# Patient Record
Sex: FEMALE | Race: BLACK OR AFRICAN AMERICAN | NOT HISPANIC OR LATINO | Employment: FULL TIME | URBAN - METROPOLITAN AREA
[De-identification: names, ages, dates, MRNs, and addresses within clinical notes are randomized per-mention and may not be internally consistent; named-entity substitution may affect disease eponyms.]

---
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
## 2022-01-27 ENCOUNTER — HOSPITAL ENCOUNTER (EMERGENCY)
Facility: HOSPITAL | Age: 22
Discharge: HOME | End: 2022-01-27
Attending: EMERGENCY MEDICINE | Admitting: EMERGENCY MEDICINE
Payer: COMMERCIAL

## 2022-01-27 VITALS
WEIGHT: 151.01 LBS | DIASTOLIC BLOOD PRESSURE: 57 MMHG | OXYGEN SATURATION: 99 % | TEMPERATURE: 98.4 F | HEART RATE: 74 BPM | SYSTOLIC BLOOD PRESSURE: 121 MMHG | RESPIRATION RATE: 18 BRPM | HEIGHT: 62 IN | BODY MASS INDEX: 27.79 KG/M2

## 2022-01-27 DIAGNOSIS — R10.9 ABDOMINAL PAIN IN PREGNANCY, SECOND TRIMESTER: Primary | ICD-10-CM

## 2022-01-27 DIAGNOSIS — O26.892 ABDOMINAL PAIN IN PREGNANCY, SECOND TRIMESTER: Primary | ICD-10-CM

## 2022-01-27 DIAGNOSIS — Z34.90 PREGNANCY, UNSPECIFIED GESTATIONAL AGE: Primary | ICD-10-CM

## 2022-01-27 LAB
ABO GROUP BLD: NORMAL
BASOPHILS # BLD AUTO: 0.03 THOUSANDS/ΜL (ref 0–0.1)
BASOPHILS NFR BLD AUTO: 0 % (ref 0–1)
BILIRUB UR QL STRIP: NEGATIVE
BILIRUB UR QL STRIP: NEGATIVE
BLD GP AB SCN SERPL QL: NEGATIVE
CLARITY UR: CLEAR
CLARITY UR: CLEAR
COLOR UR: YELLOW
COLOR UR: YELLOW
COLOR, POC: NORMAL
EOSINOPHIL # BLD AUTO: 0.05 THOUSAND/ΜL (ref 0–0.61)
EOSINOPHIL NFR BLD AUTO: 1 % (ref 0–6)
ERYTHROCYTE [DISTWIDTH] IN BLOOD BY AUTOMATED COUNT: 13.1 % (ref 11.6–15.1)
EXT PREG TEST URINE: POSITIVE
EXT. CONTROL ED NAV: ABNORMAL
GLUCOSE UR STRIP-MCNC: NEGATIVE MG/DL
GLUCOSE UR STRIP-MCNC: NEGATIVE MG/DL
HBV SURFACE AG SER QL: NORMAL
HCT VFR BLD AUTO: 30.7 % (ref 34.8–46.1)
HGB BLD-MCNC: 11 G/DL (ref 11.5–15.4)
HGB UR QL STRIP.AUTO: NEGATIVE
HGB UR QL STRIP.AUTO: NEGATIVE
IMM GRANULOCYTES # BLD AUTO: 0.03 THOUSAND/UL (ref 0–0.2)
IMM GRANULOCYTES NFR BLD AUTO: 0 % (ref 0–2)
KETONES UR STRIP-MCNC: NEGATIVE MG/DL
KETONES UR STRIP-MCNC: NEGATIVE MG/DL
LEUKOCYTE ESTERASE UR QL STRIP: NEGATIVE
LEUKOCYTE ESTERASE UR QL STRIP: NEGATIVE
LYMPHOCYTES # BLD AUTO: 2.45 THOUSANDS/ΜL (ref 0.6–4.47)
LYMPHOCYTES NFR BLD AUTO: 30 % (ref 14–44)
MCH RBC QN AUTO: 28.9 PG (ref 26.8–34.3)
MCHC RBC AUTO-ENTMCNC: 35.8 G/DL (ref 31.4–37.4)
MCV RBC AUTO: 81 FL (ref 82–98)
MONOCYTES # BLD AUTO: 0.77 THOUSAND/ΜL (ref 0.17–1.22)
MONOCYTES NFR BLD AUTO: 9 % (ref 4–12)
NEUTROPHILS # BLD AUTO: 4.95 THOUSANDS/ΜL (ref 1.85–7.62)
NEUTS SEG NFR BLD AUTO: 60 % (ref 43–75)
NITRITE UR QL STRIP: NEGATIVE
NITRITE UR QL STRIP: NEGATIVE
NRBC BLD AUTO-RTO: 0 /100 WBCS
PH UR STRIP.AUTO: 6.5 [PH]
PH UR STRIP.AUTO: 6.5 [PH] (ref 4.5–8)
PLATELET # BLD AUTO: 203 THOUSANDS/UL (ref 149–390)
PMV BLD AUTO: 10.9 FL (ref 8.9–12.7)
PROT UR STRIP-MCNC: NEGATIVE MG/DL
PROT UR STRIP-MCNC: NEGATIVE MG/DL
RBC # BLD AUTO: 3.81 MILLION/UL (ref 3.81–5.12)
RH BLD: POSITIVE
RUBV IGG SERPL IA-ACNC: 106.3 IU/ML
SP GR UR STRIP.AUTO: 1.01 (ref 1–1.03)
SP GR UR STRIP.AUTO: 1.01 (ref 1–1.03)
SPECIMEN EXPIRATION DATE: NORMAL
UROBILINOGEN UR QL STRIP.AUTO: 0.2 E.U./DL
UROBILINOGEN UR QL STRIP.AUTO: 0.2 E.U./DL
WBC # BLD AUTO: 8.28 THOUSAND/UL (ref 4.31–10.16)

## 2022-01-27 PROCEDURE — 99284 EMERGENCY DEPT VISIT MOD MDM: CPT

## 2022-01-27 PROCEDURE — 36415 COLL VENOUS BLD VENIPUNCTURE: CPT | Performed by: EMERGENCY MEDICINE

## 2022-01-27 PROCEDURE — 76815 OB US LIMITED FETUS(S): CPT | Performed by: EMERGENCY MEDICINE

## 2022-01-27 PROCEDURE — 87086 URINE CULTURE/COLONY COUNT: CPT | Performed by: EMERGENCY MEDICINE

## 2022-01-27 PROCEDURE — 81003 URINALYSIS AUTO W/O SCOPE: CPT

## 2022-01-27 PROCEDURE — 99284 EMERGENCY DEPT VISIT MOD MDM: CPT | Performed by: EMERGENCY MEDICINE

## 2022-01-27 PROCEDURE — 81025 URINE PREGNANCY TEST: CPT | Performed by: EMERGENCY MEDICINE

## 2022-01-27 PROCEDURE — 80081 OBSTETRIC PANEL INC HIV TSTG: CPT | Performed by: EMERGENCY MEDICINE

## 2022-01-27 PROCEDURE — 81003 URINALYSIS AUTO W/O SCOPE: CPT | Performed by: EMERGENCY MEDICINE

## 2022-01-27 RX ORDER — FAMOTIDINE 20 MG
1 TABLET ORAL DAILY
Qty: 30 TABLET | Refills: 0 | Status: SHIPPED | OUTPATIENT
Start: 2022-01-27

## 2022-01-27 NOTE — ED PROVIDER NOTES
History  Chief Complaint   Patient presents with    Abdominal Pain     Patient reports she started with sharp abdominal pains this morning  Patient also reports nausea  Denies vomiting/diarrhea  Patient also reports she has not had a period since September and is unsure if she is pregnant  A 25year old female with no significant past medical history; presents with suprapubic abdominal pain that began this morning  Pain does radiate across the lower abdomen, and is colicky in nature  When present it is described as sharp  Pain is made worse in certain positions and while bending over  Patient also reports having nausea for the past week, however denies vomiting  Patient has otherwise not had fever, chills, chest pain, shortness of breath, vomiting, diarrhea, dysuria, vaginal bleeding, peripheral edema and rashes  Patient does also report not having a menstrual period since September, having a small amount of spotting in November  Patient does complain of a thin white vaginal discharge  A/P:  Lower abdominal pain, reproducible suprapubic tenderness  Patient's urine pregnancy is positive, UA negative for infection  Bedside US with appropriate fetal HR and BPD measuring approximately 18 weeks  Will discuss with OB for further management  Pt is Rh positive  Pt reports one prior pregnancy which was terminated electively  History provided by:  Patient and medical records  Abdominal Pain  Associated symptoms: nausea and vaginal discharge        Prior to Admission Medications   Prescriptions Last Dose Informant Patient Reported? Taking?   metoclopramide (REGLAN) 10 mg tablet   No No   Sig: Take 1 tablet (10 mg total) by mouth every 6 (six) hours      Facility-Administered Medications: None       History reviewed  No pertinent past medical history  History reviewed  No pertinent surgical history  History reviewed  No pertinent family history    I have reviewed and agree with the history as documented  E-Cigarette/Vaping    E-Cigarette Use Never User      E-Cigarette/Vaping Substances     Social History     Tobacco Use    Smoking status: Never Smoker    Smokeless tobacco: Never Used   Vaping Use    Vaping Use: Never used   Substance Use Topics    Alcohol use: Yes    Drug use: Never       Review of Systems   Gastrointestinal: Positive for abdominal pain and nausea  Genitourinary: Positive for vaginal discharge  All other systems reviewed and are negative  Physical Exam  Physical Exam  General Appearance: alert and oriented, nad, non toxic appearing  Skin:  Warm, dry, intact  HEENT: atraumatic, normocephalic  Neck: Supple, trachea midline  Cardiac: RRR; no murmurs, rub, gallops  Pulmonary: lungs CTAB; no wheezes, rales, rhonchi  Gastrointestinal: abdomen soft, suprapubic tenderness, nondistended; no guarding or rebound tenderness; good bowel sounds, no mass or bruits  Uterine fundus palpated just below the umbilicus    Extremities:  no pedal edema, 2+ pulses; no calf tenderness, no clubbing, no cyanosis  Neuro:  no focal motor or sensory deficits, CN 2-12 grossly intact  Psych:  Normal mood and affect, normal judgement and insight      Vital Signs  ED Triage Vitals [01/27/22 1523]   Temperature Pulse Respirations Blood Pressure SpO2   98 4 °F (36 9 °C) 74 18 121/57 99 %      Temp Source Heart Rate Source Patient Position - Orthostatic VS BP Location FiO2 (%)   Oral Monitor Sitting Right arm --      Pain Score       8           Vitals:    01/27/22 1523   BP: 121/57   Pulse: 74   Patient Position - Orthostatic VS: Sitting         Visual Acuity      ED Medications  Medications - No data to display    Diagnostic Studies  Results Reviewed     Procedure Component Value Units Date/Time    CBC and differential [[id number]]  (Abnormal) Collected: 01/27/22 1703    Lab Status: Final result Specimen: Blood from Arm, Right Updated: 01/27/22 1712     WBC 8 28 Thousand/uL      RBC 3 81 Million/uL Hemoglobin 11 0 g/dL      Hematocrit 30 7 %      MCV 81 fL      MCH 28 9 pg      MCHC 35 8 g/dL      RDW 13 1 %      MPV 10 9 fL      Platelets 782 Thousands/uL      nRBC 0 /100 WBCs      Neutrophils Relative 60 %      Immat GRANS % 0 %      Lymphocytes Relative 30 %      Monocytes Relative 9 %      Eosinophils Relative 1 %      Basophils Relative 0 %      Neutrophils Absolute 4 95 Thousands/µL      Immature Grans Absolute 0 03 Thousand/uL      Lymphocytes Absolute 2 45 Thousands/µL      Monocytes Absolute 0 77 Thousand/µL      Eosinophils Absolute 0 05 Thousand/µL      Basophils Absolute 0 03 Thousands/µL     RPR [[id number]] Collected: 01/27/22 1703    Lab Status: In process Specimen: Blood from Arm, Right Updated: 01/27/22 1709    Rubella antibody, IgG [[id number]] Collected: 01/27/22 1703    Lab Status: In process Specimen: Blood from Arm, Right Updated: 01/27/22 1709    Hepatitis B surface antigen [[id number]] Collected: 01/27/22 1703    Lab Status: In process Specimen: Blood from Arm, Right Updated: 01/27/22 1709    HIV 1/2 Antigen/Antibody (4th Generation) w Reflex [facility] [[id number]] Collected: 01/27/22 1703    Lab Status: In process Specimen: Blood from Arm, Right Updated: 01/27/22 1709    UA (URINE) with reflex to Scope [[id number]] Collected: 01/27/22 1704    Lab Status: In process Specimen: Urine, Clean Catch Updated: 01/27/22 1709    Urine culture [[id number]] Updated: 01/27/22 1709    Lab Status: In process Specimen: Urine     POCT pregnancy, urine [[id number]]  (Abnormal) Resulted: 01/27/22 1533    Lab Status: Final result Updated: 01/27/22 1533     EXT PREG TEST UR (Ref: Negative) POSITIVE     Control valid      POCT urinalysis dipstick [[id number]]  (Normal) Resulted: 01/27/22 1533    Lab Status: Final result Updated: 01/27/22 1533     Color, UA normal  see chart    Urine Macroscopic, POC [[id number]] Collected: 01/27/22 1531    Lab Status: Final result Specimen: Urine Updated: 01/27/22 1533     Color, UA Yellow     Clarity, UA Clear     pH, UA 6 5     Leukocytes, UA Negative     Nitrite, UA Negative     Protein, UA Negative mg/dl      Glucose, UA Negative mg/dl      Ketones, UA Negative mg/dl      Urobilinogen, UA 0 2 E U /dl      Bilirubin, UA Negative     Blood, UA Negative     Specific Gravity, UA 1 015    Narrative:      CLINITEK RESULT                 No orders to display              Procedures  POC Pelvic US    Date/Time: 1/27/2022 4:13 PM  Performed by: Ramon Roman DO  Authorized by: Ramon Roman DO     Patient location:  ED  Procedure details:     Exam Type:  Diagnostic    Indications: evaluate for IUP and pregnant with abdominal pain      Assessment for: determine estimated gestational age and evaluate fetal viability      Technique:  Transabdominal obstetric (HCG+) exam    Views obtained: uterus (transverse and sagittal)      Image quality: diagnostic      Image availability:  Images available in PACS  Uterine findings:     Endometrial stripe: identified      Intrauterine pregnancy: identified      Single gestation: identified      Gestational sac: identified      Fetal pole: identified      Fetal heart rate: identified      Fetal heart rate (bpm):  145    Biparietal diameter (mm): 18w6d  Interpretation:     Pregnancy findings: intrauterine pregnancy (IUP)               ED Course  ED Course as of 01/27/22 1717   Thu Jan 27, 2022   1624 TT sent to [facility] attending   [phone] with Dr George Roman, [facility] attending on call  Given pt's gestation age and that she is stable, she does not require additional fetal monitoring  Will obtain pt's prenatal labs now, and provide outpatient follow up information  OB to order formal outpatient US                                               MDM    Disposition  Final diagnoses:   Abdominal pain in pregnancy, second trimester     Time reflects when diagnosis was documented in both MDM as applicable and the Disposition within this note     Time User Action Codes Description Comment    1/27/2022  4:48 PM Gonzalez Moreno Add [O26 892,  R10 9] Abdominal pain in pregnancy, second trimester       ED Disposition     ED Disposition Condition Date/Time Comment    Discharge Stable Thu Jan 27, 2022  4:48 PM Jafernandez Fontana Brie discharge to home/self care  Follow-up Information     Follow up With Specialties Details Why 2 R[facility] Obstetrics and Gynecology Follow up Please make an appointment to initiate prenatal care at the [address] [place] [address] office that is most convenient for you  [address], [address] [facility], [address] [place], [place], [address] [place] [address] Obstetrics and Gynecology   [place] [address]    [phone]    CALL TO SCHEDULE YOUR ULTRASOUND AS SOON AS POSSIBLE           Patient's Medications   Discharge Prescriptions    PRENATAL VIT-FE FUMARATE-FA (PRENATAL COMPLETE) 14-0 4 MG TABS    Take 1 tablet by mouth daily       Start Date: 1/27/2022 End Date: --       Order Dose: 1 tablet       Quantity: 30 tablet    Refills: 0       No discharge procedures on file      PDMP Review     None          ED Provider  Electronically Signed by           Colton Simmons DO  01/27/22 5372

## 2022-01-27 NOTE — DISCHARGE INSTRUCTIONS
Pregnancy at 15 to 18 [address]:   Now that you are in your second trimester, you have more energy  You may also feel hungrier than usual  You may start to experience other symptoms, such as heartburn or dizziness  You may be gaining about ½ to 1 pound a week, and your pregnancy is beginning to show  You may need to start wearing maternity clothes  DISCHARGE INSTRUCTIONS:   Return to the emergency department if:   You have pain or cramping in your abdomen or low back  You have heavy vaginal bleeding or clotting  You pass material that looks like tissue or large clots  Collect the material and bring it with you  Call your doctor or obstetrician if:   You cannot keep food or drinks down, and you are losing weight  You have light bleeding  You have chills or a fever  You have vaginal itching, burning, or pain  You have yellow, green, white, or foul-smelling vaginal discharge  You have pain or burning when you urinate, less urine than usual, or pink or bloody urine  You have questions or concerns about your condition or care  How to care for yourself at this stage of your pregnancy:       Manage heartburn  by eating 4 or 5 small meals each day instead of large meals  Avoid spicy foods  Avoid eating right before bedtime  Manage nausea and vomiting  Avoid fatty and spicy foods  Eat small meals throughout the day instead of large meals  Sumi may help to decrease nausea  Ask your healthcare provider about other ways of decreasing nausea and vomiting  Eat a variety of healthy foods  Healthy foods include fruits, vegetables, whole-grain breads, low-fat dairy foods, beans, lean meats, and fish  Drink liquids as directed  Ask how much liquid to drink each day and which liquids are best for you  Limit caffeine to less than 200 milligrams each day  Limit your intake of fish to 2 servings each week   Choose fish low in mercury such as canned light tuna, shrimp, salmon, cod, or tilapia  Do not  eat fish high in mercury such as swordfish, tilefish, shadi mackerel, and shark  Take prenatal vitamins as directed  Your need for certain vitamins and minerals, such as folic acid, increases during pregnancy  Prenatal vitamins provide some of the extra vitamins and minerals you need  Prenatal vitamins may also help to decrease the risk of certain birth defects  Do not smoke  Smoking increases your risk of a miscarriage and other health problems during your pregnancy  Smoking can cause your baby to be born too early or weigh less at birth  Ask your healthcare provider for information if you need help quitting  Do not drink alcohol  Alcohol passes from your body to your baby through the placenta  It can affect your baby's brain development and cause fetal alcohol syndrome (FAS)  FAS is a group of conditions that causes mental, behavior, and growth problems  Talk to your healthcare provider before you take any medicines  Many medicines may harm your baby if you take them when you are pregnant  Do not take any medicines, vitamins, herbs, or supplements without first talking to your healthcare provider  Never use illegal or street drugs (such as marijuana or cocaine) while you are pregnant  Safety tips during pregnancy:   Avoid hot tubs and saunas  Do not use a hot tub or sauna while you are pregnant, especially during your first trimester  Hot tubs and saunas may raise your baby's temperature and increase the risk of birth defects  Avoid toxoplasmosis  This is an infection caused by eating raw meat or being around infected cat feces  It can cause birth defects, miscarriages, and other problems  Wash your hands after you touch raw meat  Make sure any meat is well-cooked before you eat it  Avoid raw eggs and unpasteurized milk  Use gloves or ask someone else to clean your cat's litter box while you are pregnant      Changes that are happening with your baby:  By 18 weeks, your baby may be about 6 inches long from the top of the head to the rump (baby's bottom)  Your baby may weigh about 11 ounces  You may be able to feel your baby's movement at about 18 weeks or later  The first movements may not be that noticeable  They may feel like a fluttering sensation  Your baby also makes sucking movements and can hear certain sounds  What you need to know about prenatal care:  During the first 28 weeks of your pregnancy, you will see your healthcare provider once a month  Your healthcare provider will check your blood pressure and weight  You may also need any of the following:  A urine test  may also be done to check for sugar and protein  These can be signs of gestational diabetes or infection  A blood test  may be done to check for anemia (low iron level)  Fundal height check  is a measurement of your uterus to check your baby's growth  This number is usually the same as the number of weeks that you have been pregnant  An ultrasound  may be done to check your baby's development  Your healthcare provider may be able to tell you what your baby's gender is during the ultrasound  Your baby's heart rate  will be checked  © Copyright [employer] 2021 Information is for End User's use only and may not be sold, redistributed or otherwise used for commercial purposes  All illustrations and images included in CareNotes® are the copyrighted property of [employer]  or Orlando Roberts  The above information is an  only  It is not intended as medical advice for individual conditions or treatments  Talk to your doctor, nurse or pharmacist before following any medical regimen to see if it is safe and effective for you

## 2022-01-28 ENCOUNTER — TELEPHONE (OUTPATIENT)
Dept: OBGYN CLINIC | Facility: CLINIC | Age: 22
End: 2022-01-28

## 2022-01-28 LAB
HIV 1+2 AB+HIV1 P24 AG SERPL QL IA: NORMAL
RPR SER QL: NORMAL

## 2022-01-28 NOTE — TELEPHONE ENCOUNTER
----- Message from Shun Heart MD sent at 1/27/2022  4:46 PM EST -----  Regarding: Patient needs PN Intake; 18w  Hello! This patient was seen in the ED today at approx 18w; she has not yet presented for prenatal care  Can we get in touch with her to schedule something soon? Thank you! Basilia Kebede    1/28/2022 Called patient to offer OB intake, phone not accepting calls

## 2022-01-29 LAB — BACTERIA UR CULT: NORMAL

## 2022-05-25 ENCOUNTER — HOSPITAL ENCOUNTER (EMERGENCY)
Facility: HOSPITAL | Age: 22
Discharge: HOME | End: 2022-05-25
Attending: EMERGENCY MEDICINE
Payer: COMMERCIAL

## 2022-05-25 VITALS
SYSTOLIC BLOOD PRESSURE: 146 MMHG | RESPIRATION RATE: 17 BRPM | HEART RATE: 73 BPM | DIASTOLIC BLOOD PRESSURE: 79 MMHG | OXYGEN SATURATION: 99 % | TEMPERATURE: 98.1 F

## 2022-05-25 DIAGNOSIS — L03.211 FACIAL CELLULITIS: Primary | ICD-10-CM

## 2022-05-25 PROCEDURE — 99284 EMERGENCY DEPT VISIT MOD MDM: CPT | Performed by: EMERGENCY MEDICINE

## 2022-05-25 PROCEDURE — 99283 EMERGENCY DEPT VISIT LOW MDM: CPT

## 2022-05-25 RX ORDER — AMOXICILLIN AND CLAVULANATE POTASSIUM 875; 125 MG/1; MG/1
1 TABLET, FILM COATED ORAL EVERY 12 HOURS
Qty: 14 TABLET | Refills: 0 | Status: SHIPPED | OUTPATIENT
Start: 2022-05-25 | End: 2022-05-25 | Stop reason: SDUPTHER

## 2022-05-25 RX ORDER — AMOXICILLIN AND CLAVULANATE POTASSIUM 875; 125 MG/1; MG/1
1 TABLET, FILM COATED ORAL EVERY 12 HOURS
Qty: 14 TABLET | Refills: 0 | Status: SHIPPED | OUTPATIENT
Start: 2022-05-25 | End: 2022-06-01

## 2022-05-25 NOTE — ED PROVIDER NOTES
Emergency Department Note- Kathrin Ferguson 23 y o  female MRN: [id number]    Unit/Bed#: [facility] 06 Encounter: [id number]        History of Present Illness     Patient is a 79-year-old female, yesterday noticed a scant amount of redness on her left cheek, today woke up and noticed a bit of swelling around the eye and some increased redness or swelling  No fever, no chills, no headache, no blurred vision, no sore throat, no difficulty swallowing, no dental pain  Does say that last week she had about a day and half of some discomfort in 1 of her left upper teeth which resolved spontaneously  She says the swelling around the eyes has resolved significantly  REVIEW OF SYSTEMS     Constitutional:  No recent weight  gains or losses   Eyes:  No visual changes   ENT:  No tinnitus or hearing changes   Cardiac: No chest pain or palpitations   Respiratory:  No cough or shortness of breath   Abdominal:  No nausea or vomiting   Urinary: No dysuria or hematuria   Hematologic: No easy bruising or bleeding   Skin:  As per HPI   Musculoskeletal: No aches or pains   Neurologic: No weakness or sensory changes   Psychiatric: No mood changes      Historical Information   History reviewed  No pertinent past medical history  History reviewed  No pertinent surgical history  Social History   Social History     Substance and Sexual Activity   Alcohol Use Yes    Comment: occasionally     Social History     Substance and Sexual Activity   Drug Use Never     Social History     Tobacco Use   Smoking Status Never Smoker   Smokeless Tobacco Never Used     Family History: History reviewed  No pertinent family history      MEDICATIONS:  Denies  ALLERGIES:  No Known Allergies    Vitals:    05/25/22 1455   BP: 146/79   TempSrc: Oral   Pulse: 73   Resp: 17   Patient Position - Orthostatic VS: Sitting   Temp: 98 1 °F (36 7 °C)       PHYSICAL EXAM    General:  Patient is well-appearing  Head:  Atraumatic  Eyes:  Conjunctiva pink, pupils are equal react to light bilaterally  Extraocular muscles are intact bilaterally  There is no proptosis on either side popped, there is a scant amount of periorbital cellulitis on the left-hand side  Does not involve any eyelids  ENT:  Scant amount of swelling over the left cheek and inferior left orbital area  Trace macular erythema  No petechia or purpuric, no crepitus  No palpable abscess, no parotid gland swelling  Her mucous members are moist, there is no swelling on the mandible or maxillary gum line, no trismus, no or pharyngeal lesions, no swelling the posterior pharynx, no swelling the floor the mouth  No uvular deviation  Neck:  Supple, no stridor, no lymphadenopathy  Cardiac:  S1-S2, without murmurs  Lungs:  Clear to auscultation bilaterally  Abdomen:  Soft, nontender, normal bowel sounds, no CVA tenderness, no tympany, no rigidity, no guarding  Extremities:  Normal range of motion  Neurologic:  Awake, fluent speech, normal comprehension  AAOx3  Skin:  Pink warm and dry  Psychiatric:  Alert, pleasant, cooperative            Labs Reviewed - No data to display    Medications - No data to display    No orders to display            Assessment/Plan     ED Medical Decision Making:    Patient overall well appearing, no signs of orbital cellulitis, no pain with extraocular muscle movement  Supportive care, importance of follow-up and return precautions were discussed with the patient, who expressed understanding  Time reflects when diagnosis was documented in both MDM as applicable and the Disposition within this note     Time User Action Codes Description Comment    5/25/2022  3:08 PM Royal Costa Add [W75 [id number]] Facial cellulitis       ED Disposition     ED Disposition   Discharge    Condition   Stable    Date/Time   Wed May 25, 2022  3:08 PM    Brynn Antoine discharge to home/self care                 Follow-up Information     Follow up With Specialties Details Why Contact Info    [facility] Call info link, tell them that you have health insurance and that you need to be scheduled for a follow-up from the emergency department within the next 1 week   [phone]            Current Discharge Medication List      START taking these medications    Details   amoxicillin-clavulanate (AUGMENTIN) 875-125 mg per tablet Take 1 tablet by mouth every 12 (twelve) hours for 7 days  Qty: 14 tablet, Refills: 0    Associated Diagnoses: Facial cellulitis                  Carmen Landa DO  05/25/22 2773

## 2022-05-25 NOTE — DISCHARGE INSTRUCTIONS
Take the antibiotics as prescribed    Return to the nearest emergency department if you have worsening swelling, have difficulty swallowing, difficulty speaking, fever, chills , or your concern about anything else

## 2023-02-06 ENCOUNTER — HOSPITAL ENCOUNTER (EMERGENCY)
Facility: HOSPITAL | Age: 23
Discharge: HOME | End: 2023-02-06
Attending: EMERGENCY MEDICINE

## 2023-02-06 VITALS
RESPIRATION RATE: 20 BRPM | WEIGHT: 149.47 LBS | DIASTOLIC BLOOD PRESSURE: 69 MMHG | OXYGEN SATURATION: 99 % | TEMPERATURE: 98.3 F | BODY MASS INDEX: 27.34 KG/M2 | HEART RATE: 63 BPM | SYSTOLIC BLOOD PRESSURE: 137 MMHG

## 2023-02-06 DIAGNOSIS — R10.9 ABDOMINAL PAIN: Primary | ICD-10-CM

## 2023-02-06 DIAGNOSIS — R11.2 NAUSEA AND VOMITING: ICD-10-CM

## 2023-02-06 LAB
ABO GROUP BLD: NORMAL
ALBUMIN SERPL BCP-MCNC: 4.9 G/DL (ref 3.5–5)
ALP SERPL-CCNC: 74 U/L (ref 34–104)
ALT SERPL W P-5'-P-CCNC: 17 U/L (ref 7–52)
ANION GAP SERPL CALCULATED.3IONS-SCNC: 7 MMOL/L (ref 4–13)
AST SERPL W P-5'-P-CCNC: 16 U/L (ref 13–39)
B-HCG SERPL-ACNC: <1 MIU/ML (ref 0–11.6)
BASOPHILS # BLD AUTO: 0.04 THOUSANDS/ÂΜL (ref 0–0.1)
BASOPHILS NFR BLD AUTO: 1 % (ref 0–1)
BILIRUB SERPL-MCNC: 0.83 MG/DL (ref 0.2–1)
BILIRUB UR QL STRIP: NEGATIVE
BLD GP AB SCN SERPL QL: NEGATIVE
BUN SERPL-MCNC: 14 MG/DL (ref 5–25)
CALCIUM SERPL-MCNC: 9.7 MG/DL (ref 8.4–10.2)
CHLORIDE SERPL-SCNC: 105 MMOL/L (ref 96–108)
CLARITY UR: CLEAR
CO2 SERPL-SCNC: 26 MMOL/L (ref 21–32)
COLOR UR: YELLOW
CREAT SERPL-MCNC: 1.05 MG/DL (ref 0.6–1.3)
EOSINOPHIL # BLD AUTO: 0.07 THOUSAND/ÂΜL (ref 0–0.61)
EOSINOPHIL NFR BLD AUTO: 1 % (ref 0–6)
ERYTHROCYTE [DISTWIDTH] IN BLOOD BY AUTOMATED COUNT: 13.7 % (ref 11.6–15.1)
EXT PREGNANCY TEST URINE: NEGATIVE
EXT. CONTROL: NORMAL
GFR SERPL CREATININE-BSD FRML MDRD: 76 ML/MIN/1.73SQ M
GLUCOSE SERPL-MCNC: 94 MG/DL (ref 65–140)
GLUCOSE UR STRIP-MCNC: NEGATIVE MG/DL
HCT VFR BLD AUTO: 41 % (ref 34.8–46.1)
HGB BLD-MCNC: 14.1 G/DL (ref 11.5–15.4)
HGB UR QL STRIP.AUTO: NEGATIVE
IMM GRANULOCYTES # BLD AUTO: 0.02 THOUSAND/UL (ref 0–0.2)
IMM GRANULOCYTES NFR BLD AUTO: 0 % (ref 0–2)
KETONES UR STRIP-MCNC: NEGATIVE MG/DL
LACTATE SERPL-SCNC: 0.7 MMOL/L (ref 0.5–2)
LEUKOCYTE ESTERASE UR QL STRIP: NEGATIVE
LIPASE SERPL-CCNC: 12 U/L (ref 11–82)
LYMPHOCYTES # BLD AUTO: 2.78 THOUSANDS/ÂΜL (ref 0.6–4.47)
LYMPHOCYTES NFR BLD AUTO: 46 % (ref 14–44)
MAGNESIUM SERPL-MCNC: 2 MG/DL (ref 1.9–2.7)
MCH RBC QN AUTO: 27.2 PG (ref 26.8–34.3)
MCHC RBC AUTO-ENTMCNC: 34.4 G/DL (ref 31.4–37.4)
MCV RBC AUTO: 79 FL (ref 82–98)
MONOCYTES # BLD AUTO: 0.46 THOUSAND/ÂΜL (ref 0.17–1.22)
MONOCYTES NFR BLD AUTO: 8 % (ref 4–12)
NEUTROPHILS # BLD AUTO: 2.65 THOUSANDS/ÂΜL (ref 1.85–7.62)
NEUTS SEG NFR BLD AUTO: 44 % (ref 43–75)
NITRITE UR QL STRIP: NEGATIVE
NRBC BLD AUTO-RTO: 0 /100 WBCS
PH UR STRIP.AUTO: 7 [PH] (ref 4.5–8)
PLATELET # BLD AUTO: 200 THOUSANDS/UL (ref 149–390)
PMV BLD AUTO: 11.4 FL (ref 8.9–12.7)
POTASSIUM SERPL-SCNC: 3.8 MMOL/L (ref 3.5–5.3)
PROT SERPL-MCNC: 8.6 G/DL (ref 6.4–8.4)
PROT UR STRIP-MCNC: NEGATIVE MG/DL
RBC # BLD AUTO: 5.19 MILLION/UL (ref 3.81–5.12)
RH BLD: POSITIVE
SODIUM SERPL-SCNC: 138 MMOL/L (ref 135–147)
SP GR UR STRIP.AUTO: 1.02 (ref 1–1.03)
SPECIMEN EXPIRATION DATE: NORMAL
UROBILINOGEN UR QL STRIP.AUTO: 1 E.U./DL
WBC # BLD AUTO: 6.02 THOUSAND/UL (ref 4.31–10.16)

## 2023-02-06 RX ORDER — DICYCLOMINE HCL 20 MG
20 TABLET ORAL ONCE
Status: COMPLETED | OUTPATIENT
Start: 2023-02-06 | End: 2023-02-06

## 2023-02-06 RX ORDER — DICYCLOMINE HCL 20 MG
20 TABLET ORAL 2 TIMES DAILY PRN
Qty: 4 TABLET | Refills: 0 | Status: SHIPPED | OUTPATIENT
Start: 2023-02-06 | End: 2023-02-08

## 2023-02-06 RX ORDER — ONDANSETRON 4 MG/1
4 TABLET, ORALLY DISINTEGRATING ORAL EVERY 8 HOURS PRN
Qty: 6 TABLET | Refills: 0 | Status: SHIPPED | OUTPATIENT
Start: 2023-02-06 | End: 2023-02-08

## 2023-02-06 RX ADMIN — SODIUM CHLORIDE 1000 ML: 0.9 INJECTION, SOLUTION INTRAVENOUS at 16:49

## 2023-02-06 RX ADMIN — DICYCLOMINE HYDROCHLORIDE 20 MG: 20 TABLET ORAL at 17:52

## 2023-02-06 NOTE — ED PROVIDER NOTES
History  Chief Complaint   Patient presents with   • Abdominal Pain     Patient reports lower abdominal pain with spotting  Patient reports her last cycle was in the beginning of January  + preg at home with one test, second test was undetermined  Patient reports nausea and episode of vomiting Thursday with blood in urine and increased urinary frequency  Patient is a 79-year-old female coming in today with complaints of suprapubic discomfort, dysuria, vaginal spotting and pregnancy  Patient states her last period was just before New Year's  She took a urgency test at home 1 was +1 was undetermined  She has had 1  in the past but no live births  She states that he was having some intermittent suprapubic cramping with no radiation of pain  She has some nauseousness with 1-2 episodes for the past 2 days of nonbilious non-coffee-ground emesis  No hematemesis  No fevers or chills  No diarrhea  No melena or bright red blood per rectum  No recent travel or sick contacts        History provided by:  Patient  Abdominal Pain  Pain location:  Suprapubic  Pain quality: aching and cramping    Pain radiates to:  Does not radiate  Pain severity:  Mild  Onset quality:  Gradual  Timing:  Intermittent  Progression:  Worsening  Chronicity:  New  Context: recent sexual activity    Context: not alcohol use, not awakening from sleep, not diet changes, not eating, not laxative use, not medication withdrawal, not previous surgeries, not recent illness, not recent travel, not retching, not sick contacts, not suspicious food intake and not trauma    Relieved by:  None tried  Worsened by:  Nothing  Ineffective treatments:  None tried  Associated symptoms: nausea, vaginal bleeding and vomiting    Associated symptoms: no anorexia, no belching, no chest pain, no chills, no constipation, no cough, no diarrhea, no dysuria, no fatigue, no fever, no flatus, no hematemesis, no hematochezia, no hematuria, no melena, no shortness of breath, no sore throat and no vaginal discharge    Nausea:     Severity:  Mild    Onset quality:  Gradual    Timing:  Intermittent  Risk factors: no alcohol abuse, no aspirin use, has not had multiple surgeries, not obese and no recent hospitalization        Prior to Admission Medications   Prescriptions Last Dose Informant Patient Reported? Taking? Prenatal Vit-Fe Fumarate-FA (Prenatal Complete) 14-0 4 MG TABS Not Taking  No No   Sig: Take 1 tablet by mouth daily   Patient not taking: Reported on 2/6/2023   metoclopramide (REGLAN) 10 mg tablet Not Taking  No No   Sig: Take 1 tablet (10 mg total) by mouth every 6 (six) hours   Patient not taking: Reported on 2/6/2023      Facility-Administered Medications: None       History reviewed  No pertinent past medical history  History reviewed  No pertinent surgical history  History reviewed  No pertinent family history  I have reviewed and agree with the history as documented  E-Cigarette/Vaping   • E-Cigarette Use Never User      E-Cigarette/Vaping Substances   • Nicotine Yes      Social History     Tobacco Use   • Smoking status: Never   • Smokeless tobacco: Never   Vaping Use   • Vaping Use: Never used   Substance Use Topics   • Alcohol use: Yes     Comment: occasionally   • Drug use: Never       Review of Systems   Constitutional: Negative  Negative for chills, fatigue and fever  HENT: Negative  Negative for ear pain and sore throat  Eyes: Negative  Negative for pain and visual disturbance  Respiratory: Negative  Negative for cough and shortness of breath  Cardiovascular: Negative  Negative for chest pain and palpitations  Gastrointestinal: Positive for abdominal pain, nausea and vomiting  Negative for anorexia, constipation, diarrhea, flatus, hematemesis, hematochezia and melena  Genitourinary: Positive for vaginal bleeding  Negative for dysuria, hematuria and vaginal discharge  Musculoskeletal: Negative    Negative for arthralgias and back pain  Skin: Negative  Negative for color change and rash  Neurological: Negative  Negative for seizures and syncope  Hematological: Negative  Psychiatric/Behavioral: Negative  All other systems reviewed and are negative  Physical Exam  Physical Exam  Vitals and nursing note reviewed  Constitutional:       General: She is not in acute distress  Appearance: She is well-developed  HENT:      Head: Normocephalic and atraumatic  Comments: Patient maintaining airway and secretions  No stridor   No brawniness under tongue  Eyes:      Conjunctiva/sclera: Conjunctivae normal    Cardiovascular:      Rate and Rhythm: Normal rate and regular rhythm  Heart sounds: No murmur heard  Pulmonary:      Effort: Pulmonary effort is normal  No respiratory distress  Breath sounds: Normal breath sounds  Abdominal:      General: Abdomen is protuberant  Bowel sounds are normal       Palpations: Abdomen is soft  Tenderness: There is abdominal tenderness in the suprapubic area  There is no left CVA tenderness, guarding or rebound  Negative signs include Vaughn's sign and Rovsing's sign  Musculoskeletal:         General: No swelling  Cervical back: Neck supple  Skin:     General: Skin is warm and dry  Capillary Refill: Capillary refill takes less than 2 seconds  Neurological:      General: No focal deficit present  Mental Status: She is alert and oriented to person, place, and time  GCS: GCS eye subscore is 4  GCS verbal subscore is 5  GCS motor subscore is 6  Cranial Nerves: Cranial nerves 2-12 are intact  Sensory: Sensation is intact  Motor: Motor function is intact  Coordination: Coordination is intact  Gait: Gait is intact  Comments: No slurred speech  No facial asymmetry    No tongue deviation patient was bilateral for extremities and lower extremities spontaneously without pain   Psychiatric:         Mood and Affect: Mood normal          Behavior: Behavior normal          Vital Signs  ED Triage Vitals [02/06/23 1524]   Temperature Pulse Respirations Blood Pressure SpO2   98 3 °F (36 8 °C) 63 20 137/69 99 %      Temp Source Heart Rate Source Patient Position - Orthostatic VS BP Location FiO2 (%)   Oral Monitor Sitting Right arm --      Pain Score       8           Vitals:    02/06/23 1524   BP: 137/69   Pulse: 63   Patient Position - Orthostatic VS: Sitting         Visual Acuity      ED Medications  Medications   sodium chloride 0 9 % bolus 1,000 mL (0 mL Intravenous Stopped 2/6/23 1749)   dicyclomine (BENTYL) tablet 20 mg (20 mg Oral Given 2/6/23 1752)       Diagnostic Studies  Results Reviewed     Procedure Component Value Units Date/Time    Quantitative hCG [[id number]]  (Normal) Collected: 02/06/23 1641    Lab Status: Final result Specimen: Blood from Arm, Right Updated: 02/06/23 1720     HCG, Quant <1 mIU/mL     Narrative:       Expected Ranges:     Approximate               Approximate HCG  Gestation age          Concentration ( mIU/mL)  _____________          ______________________   Marvin Tanner                      HCG values  0 2-1                       5-50  1-2                           2-3                         100-5000  3-4                         500-42910  4-5                         1000-07366  5-6                         [id number]  6-8                         [id number]  8-12                        [id number]      Comprehensive metabolic panel [[id number]]  (Abnormal) Collected: 02/06/23 1641    Lab Status: Final result Specimen: Blood from Arm, Right Updated: 02/06/23 1711     Sodium 138 mmol/L      Potassium 3 8 mmol/L      Chloride 105 mmol/L      CO2 26 mmol/L      ANION GAP 7 mmol/L      BUN 14 mg/dL      Creatinine 1 05 mg/dL      Glucose 94 mg/dL      Calcium 9 7 mg/dL      AST 16 U/L      ALT 17 U/L      Alkaline Phosphatase 74 U/L      Total Protein 8 6 g/dL      Albumin 4 9 g/dL      Total Bilirubin 0 83 mg/dL      eGFR 76 ml/min/1 73sq m     Narrative:      [place] guidelines for Chronic Kidney Disease (CKD):   •  Stage 1 with normal or high GFR (GFR > 90 mL/min/1 73 square meters)  •  Stage 2 Mild CKD (GFR = 60-89 mL/min/1 73 square meters)  •  Stage 3A Moderate CKD (GFR = 45-59 mL/min/1 73 square meters)  •  Stage 3B Moderate CKD (GFR = 30-44 mL/min/1 73 square meters)  •  Stage 4 Severe CKD (GFR = 15-29 mL/min/1 73 square meters)  •  Stage 5 End Stage CKD (GFR <15 mL/min/1 73 square meters)  Note: GFR calculation is accurate only with a steady state creatinine    Magnesium [[id number]]  (Normal) Collected: 02/06/23 1641    Lab Status: Final result Specimen: Blood from Arm, Right Updated: 02/06/23 1711     Magnesium 2 0 mg/dL     Lipase [[id number]]  (Normal) Collected: 02/06/23 1641    Lab Status: Final result Specimen: Blood from Arm, Right Updated: 02/06/23 1711     Lipase 12 u/L     Lactic acid [[id number]]  (Normal) Collected: 02/06/23 1641    Lab Status: Final result Specimen: Blood from Arm, Right Updated: 02/06/23 1710     LACTIC ACID 0 7 mmol/L     Narrative:      Result may be elevated if tourniquet was used during collection      CBC and differential [[id number]]  (Abnormal) Collected: 02/06/23 1641    Lab Status: Final result Specimen: Blood from Arm, Right Updated: 02/06/23 1651     WBC 6 02 Thousand/uL      RBC 5 19 Million/uL      Hemoglobin 14 1 g/dL      Hematocrit 41 0 %      MCV 79 fL      MCH 27 2 pg      MCHC 34 4 g/dL      RDW 13 7 %      MPV 11 4 fL      Platelets 766 Thousands/uL      nRBC 0 /100 WBCs      Neutrophils Relative 44 %      Immat GRANS % 0 %      Lymphocytes Relative 46 %      Monocytes Relative 8 %      Eosinophils Relative 1 %      Basophils Relative 1 %      Neutrophils Absolute 2 65 Thousands/µL      Immature Grans Absolute 0 02 Thousand/uL      Lymphocytes Absolute 2 78 Thousands/µL      Monocytes Absolute 0 46 Thousand/µL      Eosinophils Absolute 0 07 Thousand/µL      Basophils Absolute 0 04 Thousands/µL     POCT pregnancy, urine [[id number]]  (Normal) Resulted: 02/06/23 1640    Lab Status: Final result Updated: 02/06/23 1648     EXT Preg Test, Ur Negative     Control Valid    Urine Macroscopic, POC [[id number]] Collected: 02/06/23 1630    Lab Status: Final result Specimen: Urine Updated: 02/06/23 1632     Color, UA Yellow     Clarity, UA Clear     pH, UA 7 0     Leukocytes, UA Negative     Nitrite, UA Negative     Protein, UA Negative mg/dl      Glucose, UA Negative mg/dl      Ketones, UA Negative mg/dl      Urobilinogen, UA 1 0 E U /dl      Bilirubin, UA Negative     Occult Blood, UA Negative     Specific Gravity, UA 1 025    Narrative:      CLINITEK RESULT                 No orders to display              Procedures  Procedures         ED Course  ED Course as of 02/06/23 1844   Mon Feb 06, 2023   1623 Patient is a 82-year-old female coming in today with abdominal pain and spotting that started last night into today  On exam well-appearing no distress  Abdominal is soft nontender nondistended with no rebound or guarding  Will check urine, labs give IV fluids as well as a      Disclosure: Voice to text software was used in the preparation of this document and could have resulted in translational errors       Occasional wrong word or "sound a like" substitutions may have occurred due to the inherent limitations of voice recognition software   Read the chart carefully and recognize, using context, where substitutions have occurred         1715 Patient's urine is negative  No evidence of UTI and pregnancy test was negative  Patient's labs reveal no evidence of endorgan damage, septicemia, anemia,  No evidence of elevated lipase or lactic acid  Pending beta quant   1740 Beta quant less than 1  Patient resting in bed in no distress talking on the phone   [id number] Patient updated on labs    Will give Bentyl and Zofran prior to DC as well as DC home with these with return to instructions                                             Medical Decision Making  Differential diagnosis includes but not limited to:  Appendicitis, viral syndrome, constipation, AMI, NSTEMI, pneumonia, pneuothorax, gerd, gastritis,  mesenteric ischemia, mesenteric adenitis, pancreatitis, cholecystitis, choledocholithiasis, hepatitis, bowel obstruction, ileus, gastroenteritis, colitis, malignancy, AAA, perforation, toxicologic poisoning, renal infarct, acute kidney injury, splenic infarct, splenic injury, nephrolithiasis, UTI, muscular strain, intra-abdominal hematoma, hernia, ovarian cyst, ovarian torsion, ectopic pregnancy, rectal prolapse, pain no rectal fistula, a no rectal fissures, hemorrhoids, perirectal abscess, threatened , PID, abruption, molar pregnancy, dislodged IUD, fibroid uterus, salpingitis, tubo-ovarian abscess, dysmenorrhea, cervicitis,      Amount and/or Complexity of Data Reviewed  Labs: ordered  Decision-making details documented in ED Course  Disposition  Final diagnoses:   Abdominal pain   Nausea and vomiting     Time reflects when diagnosis was documented in both MDM as applicable and the Disposition within this note     Time User Action Codes Description Comment    2023  5:43 PM Josi Miranda Add [R10 9] Abdominal pain     2023  5:43 PM Stanford Miranda Add [R11 2] Nausea and vomiting       ED Disposition     ED Disposition   Discharge    Condition   Stable    Date/Time     5:43 PM    Comment   Lana Almazan discharge to home/self care                 Follow-up Information     Follow up With Specialties Details Why Contact Info Additional [facility] Schedule an appointment as soon as possible for a visit in 1 week  [address], [address] [facility] [address]  [address] [place] [address], [address], [address], [place], [address] [place]          Discharge Medication List as of 2/6/2023  5:44 PM      START taking these medications    Details   dicyclomine (BENTYL) 20 mg tablet Take 1 tablet (20 mg total) by mouth 2 (two) times a day as needed (abd pain) for up to 2 days, Starting Mon 2/6/2023, Until Wed 2/8/2023 at 2359, Normal      ondansetron (Zofran ODT) 4 mg disintegrating tablet Take 1 tablet (4 mg total) by mouth every 8 (eight) hours as needed for nausea or vomiting for up to 2 days, Starting Mon 2/6/2023, Until Wed 2/8/2023 at 2359, Normal         CONTINUE these medications which have NOT CHANGED    Details   metoclopramide (REGLAN) 10 mg tablet Take 1 tablet (10 mg total) by mouth every 6 (six) hours, Starting Mon 4/26/2021, Normal      Prenatal Vit-Fe Fumarate-FA (Prenatal Complete) 14-0 4 MG TABS Take 1 tablet by mouth daily, Starting Thu 1/27/2022, Print             No discharge procedures on file      PDMP Review     None          ED Provider  Electronically Signed by           Julio Pace DO  02/06/23 5781

## 2025-02-04 ENCOUNTER — HOSPITAL ENCOUNTER (EMERGENCY)
Facility: HOSPITAL | Age: 25
Discharge: HOME | End: 2025-02-05
Attending: EMERGENCY MEDICINE

## 2025-02-04 VITALS
WEIGHT: 141.09 LBS | BODY MASS INDEX: 25.81 KG/M2 | DIASTOLIC BLOOD PRESSURE: 64 MMHG | TEMPERATURE: 98.4 F | OXYGEN SATURATION: 100 % | HEART RATE: 83 BPM | RESPIRATION RATE: 20 BRPM | SYSTOLIC BLOOD PRESSURE: 143 MMHG

## 2025-02-04 DIAGNOSIS — K04.7 DENTAL INFECTION: Primary | ICD-10-CM

## 2025-02-04 DIAGNOSIS — K08.89 PAIN, DENTAL: ICD-10-CM

## 2025-02-04 PROCEDURE — 99282 EMERGENCY DEPT VISIT SF MDM: CPT

## 2025-02-05 PROCEDURE — 99284 EMERGENCY DEPT VISIT MOD MDM: CPT

## 2025-02-05 RX ORDER — ACETAMINOPHEN 325 MG/1
975 TABLET ORAL ONCE
Status: COMPLETED | OUTPATIENT
Start: 2025-02-05 | End: 2025-02-05

## 2025-02-05 RX ORDER — LIDOCAINE HYDROCHLORIDE 20 MG/ML
15 SOLUTION OROPHARYNGEAL ONCE
Status: COMPLETED | OUTPATIENT
Start: 2025-02-05 | End: 2025-02-05

## 2025-02-05 RX ORDER — KETOROLAC TROMETHAMINE 10 MG/1
10 TABLET, FILM COATED ORAL ONCE
Status: DISCONTINUED | OUTPATIENT
Start: 2025-02-05 | End: 2025-02-05

## 2025-02-05 RX ORDER — ACETAMINOPHEN 500 MG
1000 TABLET ORAL EVERY 8 HOURS
Qty: 40 TABLET | Refills: 0 | Status: SHIPPED | OUTPATIENT
Start: 2025-02-05

## 2025-02-05 RX ORDER — OXYCODONE HYDROCHLORIDE 5 MG/1
5 TABLET ORAL ONCE
Refills: 0 | Status: COMPLETED | OUTPATIENT
Start: 2025-02-05 | End: 2025-02-05

## 2025-02-05 RX ORDER — IBUPROFEN 600 MG/1
600 TABLET, FILM COATED ORAL EVERY 6 HOURS PRN
Qty: 30 TABLET | Refills: 0 | Status: SHIPPED | OUTPATIENT
Start: 2025-02-05

## 2025-02-05 RX ADMIN — ACETAMINOPHEN 975 MG: 325 TABLET, FILM COATED ORAL at 01:03

## 2025-02-05 RX ADMIN — OXYCODONE 5 MG: 5 TABLET ORAL at 01:03

## 2025-02-05 RX ADMIN — AMOXICILLIN AND CLAVULANATE POTASSIUM 1 TABLET: 875; 125 TABLET, COATED ORAL at 01:04

## 2025-02-05 RX ADMIN — LIDOCAINE HYDROCHLORIDE 15 ML: 20 SOLUTION ORAL at 01:05

## 2025-02-05 NOTE — ED PROVIDER NOTES
Time reflects when diagnosis was documented in both MDM as applicable and the Disposition within this note       Time User Action Codes Description Comment    2/5/2025 12:30 AM Jay Bustamante Add [K04.7] Dental infection     2/5/2025 12:30 AM Jay Bustamante Add [K08.89] Pain, dental           ED Disposition       ED Disposition   Discharge    Condition   Stable    Date/Time   Wed Feb 5, 2025 12:30 AM    Comment   Derian Polanco discharge to home/self care.                   Assessment & Plan       Medical Decision Making  25-year-old female presents to ED for evaluation of dental pain as seen in HPI.  On physical examination patient afebrile, nontachycardic.  Examination of patient's oral cavity demonstrates slightly chipped right lower molar.  Surrounding buccal swelling.  No drainable dental abscesses.  No Ludewig's angina.  No angioedema.  Airway intact.  No stridor.  No respiratory distress.  Suspect dental infection.  Plan to treat with Augmentin.  For pain control in ED provided oxycodone, Tylenol, viscous lidocaine.  Prescription for Augmentin, Tylenol, ibuprofen given to patient.  Provided contact information to oral and maxillofacial surgery, [facility].  Advised patient to contact her dentist and tell them that she was in the ED for dental pain.  Could alternatively contact these aforementioned resources to make an appointment.  Return precautions discussed.  Patient agreeable to plan.  Patient discharged.     Prior to discharge, discharge instructions were discussed with patient at bedside. Patient was provided both verbal and written instructions. Patient is understanding of the discharge instructions and is agreeable to plan of care. Return precautions were discussed with patient bedside, patient verbalized understanding of signs and symptoms that would necessitate return to the ED. All questions were answered. Patient was comfortable with the plan of care and discharged to  "home.    Portions of this chart may have been written with voice recognition software.  Occasional grammatical errors, wrong word or \"sound a like\" substitutions may have occurred due to software limitations.  Please read carefully and use context to recognize where substitutions have occurred.     Risk  OTC drugs.  Prescription drug management.             Medications   amoxicillin-clavulanate (AUGMENTIN) 875-125 mg per tablet 1 tablet (1 tablet Oral Given 2/5/25 0104)   oxyCODONE (ROXICODONE) IR tablet 5 mg (5 mg Oral Given 2/5/25 0103)   acetaminophen (TYLENOL) tablet 975 mg (975 mg Oral Given 2/5/25 0103)   Lidocaine Viscous HCl (XYLOCAINE) 2 % mucosal solution 15 mL (15 mL Swish & Spit Given 2/5/25 0105)       ED Risk Strat Scores                                              History of Present Illness       Chief Complaint   Patient presents with    Dental Pain     Patient c/o left sided dental pain for the last couple days.  Patient states she was not able to make an appointment with her regular dentist. Patient last took tylenol this morning        No past medical history on file.   No past surgical history on file.   No family history on file.   Social History     Tobacco Use    Smoking status: Never    Smokeless tobacco: Never   Vaping Use    Vaping status: Never Used   Substance Use Topics    Alcohol use: Yes     Comment: occasionally    Drug use: Never      E-Cigarette/Vaping    E-Cigarette Use Never User       E-Cigarette/Vaping Substances    Nicotine Yes       I have reviewed and agree with the history as documented.     25-year-old female presents to ED for evaluation of dental pain.  Patient states that for the last couple days she has been experiencing right lower molar pain.  Pain is worsening.  Patient noticed swelling near site of pain.  She tried to make an appointment with her dentist but states it is a long wait time.  Denies fever, chills, shortness of breath, tongue swelling, throat " tightness.  Denies any recent trauma to the tooth.         Review of Systems   HENT:  Positive for dental problem.    All other systems reviewed and are negative.          Objective       ED Triage Vitals   Temperature Pulse Blood Pressure Respirations SpO2 Patient Position - Orthostatic VS   02/04/25 2328 02/04/25 2328 02/04/25 2328 02/04/25 2328 02/04/25 2328 --   98.4 °F (36.9 °C) 83 143/64 20 100 %       Temp src Heart Rate Source BP Location FiO2 (%) Pain Score    -- -- -- -- 02/05/25 0103        10 - Worst Possible Pain      Vitals      Date and Time Temp Pulse SpO2 Resp BP Pain Score FACES Pain Rating User   02/05/25 0103 -- -- -- -- -- 10 - Worst Possible Pain -- NM   02/04/25 2328 98.4 °F (36.9 °C) 83 100 % 20 143/64 -- -- ZC            Physical Exam  Vitals and nursing note reviewed.   Constitutional:       General: She is not in acute distress.     Appearance: Normal appearance. She is well-developed. She is not toxic-appearing or diaphoretic.   HENT:      Head: Normocephalic and atraumatic.      Mouth/Throat:      Mouth: No angioedema.      Dentition: Abnormal dentition. Dental tenderness present. No dental abscesses.     Eyes:      Conjunctiva/sclera: Conjunctivae normal.   Cardiovascular:      Rate and Rhythm: Normal rate and regular rhythm.      Heart sounds: No murmur heard.  Pulmonary:      Effort: Pulmonary effort is normal. No respiratory distress.      Breath sounds: Normal breath sounds.   Musculoskeletal:      Cervical back: Neck supple.   Skin:     General: Skin is warm and dry.      Capillary Refill: Capillary refill takes less than 2 seconds.   Neurological:      Mental Status: She is alert.   Psychiatric:         Mood and Affect: Mood normal.         Results Reviewed       None            No orders to display       Procedures    ED Medication and Procedure Management   Prior to Admission Medications   Prescriptions Last Dose Informant Patient Reported? Taking?   Prenatal Vit-Fe Fumarate-FA  (Prenatal Complete) 14-0.4 MG TABS   No No   Sig: Take 1 tablet by mouth daily   Patient not taking: Reported on 2/6/2023   dicyclomine (BENTYL) 20 mg tablet   No No   Sig: Take 1 tablet (20 mg total) by mouth 2 (two) times a day as needed (abd pain) for up to 2 days   metoclopramide (REGLAN) 10 mg tablet   No No   Sig: Take 1 tablet (10 mg total) by mouth every 6 (six) hours   Patient not taking: Reported on 2/6/2023   ondansetron (Zofran ODT) 4 mg disintegrating tablet   No No   Sig: Take 1 tablet (4 mg total) by mouth every 8 (eight) hours as needed for nausea or vomiting for up to 2 days      Facility-Administered Medications: None     Discharge Medication List as of 2/5/2025 12:34 AM        START taking these medications    Details   acetaminophen (TYLENOL) 500 mg tablet Take 2 tablets (1,000 mg total) by mouth every 8 (eight) hours, Starting Wed 2/5/2025, Normal      amoxicillin-clavulanate (AUGMENTIN) 875-125 mg per tablet Take 1 tablet by mouth every 12 (twelve) hours for 7 days, Starting Wed 2/5/2025, Until Wed 2/12/2025, Normal      ibuprofen (MOTRIN) 600 mg tablet Take 1 tablet (600 mg total) by mouth every 6 (six) hours as needed for mild pain, Starting Wed 2/5/2025, Normal           CONTINUE these medications which have NOT CHANGED    Details   dicyclomine (BENTYL) 20 mg tablet Take 1 tablet (20 mg total) by mouth 2 (two) times a day as needed (abd pain) for up to 2 days, Starting Mon 2/6/2023, Until Wed 2/8/2023 at 2359, Normal      metoclopramide (REGLAN) 10 mg tablet Take 1 tablet (10 mg total) by mouth every 6 (six) hours, Starting Mon 4/26/2021, Normal      ondansetron (Zofran ODT) 4 mg disintegrating tablet Take 1 tablet (4 mg total) by mouth every 8 (eight) hours as needed for nausea or vomiting for up to 2 days, Starting Mon 2/6/2023, Until Wed 2/8/2023 at 2359, Normal      Prenatal Vit-Fe Fumarate-FA (Prenatal Complete) 14-0.4 MG TABS Take 1 tablet by mouth daily, Starting Thu 1/27/2022, Print              ED SEPSIS DOCUMENTATION   Time reflects when diagnosis was documented in both MDM as applicable and the Disposition within this note       Time User Action Codes Description Comment    2/5/2025 12:30 AM Jay Bustamante [K04.7] Dental infection     2/5/2025 12:30 AM Jay Bustamante [K08.89] Pain, dental                  Jay Bustamante PA-C  02/06/25 0338

## 2025-02-05 NOTE — DISCHARGE INSTRUCTIONS
Today I provided prescription for Augmentin, Tylenol, ibuprofen.  Augmentin as antibiotic used to treat dental infection.  The Tylenol and ibuprofen is for pain control.  Recommend contacting your dentist and letting them know that you are in the ED for dental pain.  This may help you get a sooner appointment with your dentist.  Alternatively, you could try calling the provided phone number to the [facility], [facility] oral and maxillofacial surgery.  These 2 services may be able to provide sooner appointment.  Monitor symptoms and return to ED for new or worsening symptoms.

## 2025-03-19 ENCOUNTER — HOSPITAL ENCOUNTER (EMERGENCY)
Facility: HOSPITAL | Age: 25
Discharge: HOME | End: 2025-03-19
Attending: EMERGENCY MEDICINE | Admitting: EMERGENCY MEDICINE

## 2025-03-19 VITALS
HEART RATE: 78 BPM | OXYGEN SATURATION: 99 % | SYSTOLIC BLOOD PRESSURE: 163 MMHG | TEMPERATURE: 98.6 F | WEIGHT: 147.49 LBS | DIASTOLIC BLOOD PRESSURE: 95 MMHG | RESPIRATION RATE: 16 BRPM | BODY MASS INDEX: 26.98 KG/M2

## 2025-03-19 DIAGNOSIS — K08.89 DENTALGIA: Primary | ICD-10-CM

## 2025-03-19 PROCEDURE — 99284 EMERGENCY DEPT VISIT MOD MDM: CPT | Performed by: EMERGENCY MEDICINE

## 2025-03-19 PROCEDURE — 99282 EMERGENCY DEPT VISIT SF MDM: CPT

## 2025-03-19 RX ORDER — IBUPROFEN 600 MG/1
600 TABLET, FILM COATED ORAL ONCE
Status: COMPLETED | OUTPATIENT
Start: 2025-03-19 | End: 2025-03-19

## 2025-03-19 RX ORDER — IBUPROFEN 600 MG/1
600 TABLET, FILM COATED ORAL EVERY 6 HOURS PRN
Qty: 30 TABLET | Refills: 0 | Status: SHIPPED | OUTPATIENT
Start: 2025-03-19

## 2025-03-19 RX ORDER — OXYCODONE HYDROCHLORIDE 5 MG/1
5 TABLET ORAL ONCE
Refills: 0 | Status: COMPLETED | OUTPATIENT
Start: 2025-03-19 | End: 2025-03-19

## 2025-03-19 RX ORDER — PENICILLIN V POTASSIUM 500 MG/1
500 TABLET, FILM COATED ORAL 4 TIMES DAILY
Qty: 28 TABLET | Refills: 0 | Status: SHIPPED | OUTPATIENT
Start: 2025-03-19 | End: 2025-03-26

## 2025-03-19 RX ORDER — PENICILLIN V POTASSIUM 250 MG/1
500 TABLET, FILM COATED ORAL ONCE
Status: COMPLETED | OUTPATIENT
Start: 2025-03-19 | End: 2025-03-19

## 2025-03-19 RX ADMIN — IBUPROFEN 600 MG: 600 TABLET, FILM COATED ORAL at 08:23

## 2025-03-19 RX ADMIN — OXYCODONE 5 MG: 5 TABLET ORAL at 08:23

## 2025-03-19 RX ADMIN — PENICILLIN V POTASSIUM 500 MG: 250 TABLET, FILM COATED ORAL at 08:23

## 2025-03-19 NOTE — Clinical Note
Derian Polanco was seen and treated in our emergency department on 3/19/2025.                Diagnosis:     Derian  may return to work on return date.    She may return on this date: 03/20/2025         If you have any questions or concerns, please don't hesitate to call.      Evelyn Merida, DO    ______________________________           _______________          _______________  Hospital Representative                              Date                                Time

## 2025-03-19 NOTE — ED PROVIDER NOTES
Time reflects when diagnosis was documented in both MDM as applicable and the Disposition within this note       Time User Action Codes Description Comment    3/19/2025  8:11 AM Evelyn Merida Add [K08.89] Dentalgia           ED Disposition       ED Disposition   Discharge    Condition   Stable    Date/Time   Wed Mar 19, 2025  8:13 AM    Comment   Derian Polanco discharge to home/self care.                   Assessment & Plan       Medical Decision Making  25-year-old female patient presenting with right lower dental pain.    Initial considerations include dental caries, dental abscess, periapical abscess, dental fracture, facial cellulitis, Marcus's angina, peritonsillar abscess, retropharyngeal abscess.      Patient presents for dental pain due to suspected dental caries, no appreciable abscess noted. Patient not immunosuppressed, afebrile, and well appearing with patent airway, have low suspicion for deep space infection or any concern for airway compromise, based on H&P. No evidence of retropharyngeal abscess, peritonsillar abscess, Marcus's angina, periapical abscess, or facial cellulitis.     Will place on antibiotics and instructed patient to continue to treat pain with ibuprofen/acetaminophen until they see a dentist. Patient discharged home and will follow up with dentist. Discussed return precautions for odontogenic infections and other dental pain emergencies.         Risk  OTC drugs.  Prescription drug management.        ED Course as of 03/19/25 0841   Wed Mar 19, 2025   0801 Temperature: 98.6 °F (37 °C)  afebrile       Medications   oxyCODONE (ROXICODONE) IR tablet 5 mg (5 mg Oral Given 3/19/25 0823)   penicillin V potassium (VEETID) tablet 500 mg (500 mg Oral Given 3/19/25 0823)   ibuprofen (MOTRIN) tablet 600 mg (600 mg Oral Given 3/19/25 0823)       ED Risk Strat Scores                            SBIRT 20yo+      Flowsheet Row Most Recent Value   Initial Alcohol Screen: US AUDIT-C     1. How  often do you have a drink containing alcohol? 0 Filed at: 03/19/2025 0817   2. How many drinks containing alcohol do you have on a typical day you are drinking?  0 Filed at: 03/19/2025 0817   3a. Male UNDER 65: How often do you have five or more drinks on one occasion? 0 Filed at: 03/19/2025 0817   3b. FEMALE Any Age, or MALE 65+: How often do you have 4 or more drinks on one occassion? 0 Filed at: 03/19/2025 0817   Audit-C Score 0 Filed at: 03/19/2025 0817   GUS: How many times in the past year have you...    Used an illegal drug or used a prescription medication for non-medical reasons? Never Filed at: 03/19/2025 0817                            History of Present Illness       Chief Complaint   Patient presents with    Dental Pain     Pt has R sided dental pain, does not have appt until next month.        History reviewed. No pertinent past medical history.   History reviewed. No pertinent surgical history.   History reviewed. No pertinent family history.   Social History     Tobacco Use    Smoking status: Never    Smokeless tobacco: Never   Vaping Use    Vaping status: Never Used   Substance Use Topics    Alcohol use: Yes     Comment: occasionally    Drug use: Never      E-Cigarette/Vaping    E-Cigarette Use Never User       E-Cigarette/Vaping Substances    Nicotine Yes       I have reviewed and agree with the history as documented.     25 y.o. F p/w dental pain.  Pt reports she was evaluated here for the same on 2/4/25 and told to return if pain worsens.  Having pain at right lower molar.  No facial swelling or erythema.  No fevers or N/V.  No difficulty swallowing, sore throat, or neck pain.  Pt has dental appt next month.      History provided by:  Patient   used: No    Dental Pain  Associated symptoms: no drooling, no facial swelling, no fever and no neck pain        Review of Systems   Constitutional:  Negative for chills and fever.   HENT:  Positive for dental problem. Negative for  drooling, facial swelling, sore throat and trouble swallowing.    Respiratory:  Negative for shortness of breath.    Cardiovascular:  Negative for chest pain.   Gastrointestinal:  Negative for nausea and vomiting.   Musculoskeletal:  Negative for neck pain and neck stiffness.           Objective       ED Triage Vitals [03/19/25 0754]   Temperature Pulse Blood Pressure Respirations SpO2 Patient Position - Orthostatic VS   98.6 °F (37 °C) 78 163/95 16 99 % Sitting      Temp Source Heart Rate Source BP Location FiO2 (%) Pain Score    Oral Monitor Right arm -- 9      Vitals      Date and Time Temp Pulse SpO2 Resp BP Pain Score FACES Pain Rating User   03/19/25 0823 -- -- -- -- -- 9 -- CO   03/19/25 0754 98.6 °F (37 °C) 78 99 % 16 163/95 9 --             Physical Exam  Vitals and nursing note reviewed.   Constitutional:       General: She is not in acute distress.     Appearance: She is well-developed. She is not ill-appearing, toxic-appearing or diaphoretic.   HENT:      Head: Normocephalic.      Jaw: There is normal jaw occlusion. No trismus, swelling, pain on movement or malocclusion.      Mouth/Throat:      Mouth: Mucous membranes are moist. Mucous membranes are not pale and not dry. No angioedema.      Dentition: Abnormal dentition. Dental tenderness present. No dental abscesses.      Tongue: Tongue does not deviate from midline.      Pharynx: Oropharynx is clear. Uvula midline. No pharyngeal swelling, oropharyngeal exudate, posterior oropharyngeal erythema or uvula swelling.      Tonsils: No tonsillar exudate or tonsillar abscesses.     Neck:      Trachea: Phonation normal.      Comments: No erythema overlying neck.  No masses or swelling.    Cardiovascular:      Rate and Rhythm: Normal rate and regular rhythm.      Heart sounds: Normal heart sounds.   Pulmonary:      Effort: Pulmonary effort is normal. No accessory muscle usage, respiratory distress or retractions.      Breath sounds: Normal breath sounds and  air entry. No stridor. No wheezing, rhonchi or rales.   Musculoskeletal:      Cervical back: Normal range of motion and neck supple. No edema, erythema, rigidity or crepitus.   Lymphadenopathy:      Head:      Right side of head: No submental or submandibular adenopathy.      Left side of head: No submental or submandibular adenopathy.      Cervical: No cervical adenopathy.   Skin:     General: Skin is warm and dry.      Coloration: Skin is not cyanotic or mottled.      Findings: No erythema or rash.   Neurological:      Mental Status: She is alert. She is not disoriented.      GCS: GCS eye subscore is 4. GCS verbal subscore is 5. GCS motor subscore is 6.      Comments: Cranial nerves grossly intact         Results Reviewed       None            No orders to display       Procedures    ED Medication and Procedure Management   Prior to Admission Medications   Prescriptions Last Dose Informant Patient Reported? Taking?   Prenatal Vit-Fe Fumarate-FA (Prenatal Complete) 14-0.4 MG TABS   No No   Sig: Take 1 tablet by mouth daily   Patient not taking: Reported on 2/6/2023   acetaminophen (TYLENOL) 500 mg tablet   No No   Sig: Take 2 tablets (1,000 mg total) by mouth every 8 (eight) hours   dicyclomine (BENTYL) 20 mg tablet   No No   Sig: Take 1 tablet (20 mg total) by mouth 2 (two) times a day as needed (abd pain) for up to 2 days   ibuprofen (MOTRIN) 600 mg tablet   No No   Sig: Take 1 tablet (600 mg total) by mouth every 6 (six) hours as needed for mild pain   metoclopramide (REGLAN) 10 mg tablet   No No   Sig: Take 1 tablet (10 mg total) by mouth every 6 (six) hours   Patient not taking: Reported on 2/6/2023   ondansetron (Zofran ODT) 4 mg disintegrating tablet   No No   Sig: Take 1 tablet (4 mg total) by mouth every 8 (eight) hours as needed for nausea or vomiting for up to 2 days      Facility-Administered Medications: None     Patient's Medications   Discharge Prescriptions    BENZOCAINE (ORAJEL) 10 % MUCOSAL GEL     Apply 1 Application to the mouth or throat as needed for mucositis       Start Date: 3/19/2025 End Date: --       Order Dose: 1 Application       Quantity: 7 g    Refills: 0    IBUPROFEN (MOTRIN) 600 MG TABLET    Take 1 tablet (600 mg total) by mouth every 6 (six) hours as needed for mild pain       Start Date: 3/19/2025 End Date: --       Order Dose: 600 mg       Quantity: 30 tablet    Refills: 0    PENICILLIN V POTASSIUM (VEETID) 500 MG TABLET    Take 1 tablet (500 mg total) by mouth 4 (four) times a day for 7 days       Start Date: 3/19/2025 End Date: 3/26/2025       Order Dose: 500 mg       Quantity: 28 tablet    Refills: 0     No discharge procedures on file.  ED SEPSIS DOCUMENTATION   Time reflects when diagnosis was documented in both MDM as applicable and the Disposition within this note       Time User Action Codes Description Comment    3/19/2025  8:11 AM Evelyn Merida [K08.89] Dentalgia                  Evelyn Merida DO  03/19/25 0841

## 2025-04-21 ENCOUNTER — HOSPITAL ENCOUNTER (EMERGENCY)
Facility: HOSPITAL | Age: 25
Discharge: HOME | End: 2025-04-21

## 2025-04-21 VITALS
WEIGHT: 153.88 LBS | HEART RATE: 85 BPM | DIASTOLIC BLOOD PRESSURE: 99 MMHG | OXYGEN SATURATION: 97 % | SYSTOLIC BLOOD PRESSURE: 181 MMHG | RESPIRATION RATE: 16 BRPM | BODY MASS INDEX: 28.15 KG/M2 | TEMPERATURE: 98.8 F

## 2025-04-21 DIAGNOSIS — K08.89 DENTALGIA: Primary | ICD-10-CM

## 2025-04-21 PROCEDURE — 96372 THER/PROPH/DIAG INJ SC/IM: CPT

## 2025-04-21 PROCEDURE — 99282 EMERGENCY DEPT VISIT SF MDM: CPT

## 2025-04-21 PROCEDURE — 99284 EMERGENCY DEPT VISIT MOD MDM: CPT

## 2025-04-21 RX ORDER — KETOROLAC TROMETHAMINE 30 MG/ML
15 INJECTION, SOLUTION INTRAMUSCULAR; INTRAVENOUS ONCE
Status: COMPLETED | OUTPATIENT
Start: 2025-04-21 | End: 2025-04-21

## 2025-04-21 RX ORDER — NAPROXEN 500 MG/1
500 TABLET ORAL 2 TIMES DAILY WITH MEALS
Qty: 10 TABLET | Refills: 0 | Status: SHIPPED | OUTPATIENT
Start: 2025-04-21 | End: 2025-04-26

## 2025-04-21 RX ADMIN — KETOROLAC TROMETHAMINE 15 MG: 30 INJECTION, SOLUTION INTRAMUSCULAR; INTRAVENOUS at 02:01

## 2025-04-21 NOTE — ED ATTENDING ATTESTATION
I, Phill Lawson DO, saw and evaluated the patient. All available labs and X-rays were reviewed. I discussed the patient with the resident / non-physician and agree with the resident's / non-physician practitioner's findings and plan as documented in the resident's / non-physician practicitioner's note, except where noted. At this point, I agree with the current assessment done in the ED.     NAME: Derian Polanco  AGE: 25 y.o. SEX: female  : 2000   MRN: [id number]  ENCOUNTER: [id number]    Disposition   Active Problems:  There are no active Hospital Problems.      ED Disposition       ED Disposition   Discharge    Condition   Stable    Date/Time     1:52 AM    Comment   Derian Polanco discharge to home/self care.                      Assessment/Plan   Medical Decision Making  Patient with history as below presented with dental pain. Patient presents hemodynamically stable with vitals remarkable for hypertension otherwise unremarkable. History obtained from patient.    After initial evaluation differential diagnosis includes: Dentalgia, dental caries.     Plan: Do not see indication for testing at this time.  Initial therapeutics to include Toradol.     After initial evaluation patient was treated therapeutically with below with improvement in symptoms. Reassessed the patient and they continue to be well appearing. Given her reassuring clinical exam, lack of signs/symptoms of infection, as well as her close follow-up with her dentist later this morning, I suspect the patient's presentation is due to a dental carry with slight cracking of the tooth and can be managed on the outpatient basis. Stable for outpatient management.    Disposition: Discharged with instructions to obtain outpatient follow up of patient's symptoms and findings, with strict return precautions if patient develops new or worsening symptoms. Patient understands this plan and is agreeable. All questions answered.  Patient discharged home with return precautions.    Risk  Prescription drug management.                        History of Present Illness     Patient is a 25 y.o. female no significant past medical history, presenting for evaluation of dental pain.  Patient reports that she has been having some pain in the right sided lower molar for the last several months.  She made a dentist appointment which is actually scheduled for later this morning.  She did have some improvement over the last few weeks, however over the last day he has had a recurrence of how bad her pain is.  She denies any trismus.  She denies fevers.  She denies any difficulty swallowing or handling secretions.  She took some Tylenol several hours ago but is still having persistent pain.  Patient otherwise without complaint.    Past Medical History   History reviewed. No pertinent past medical history.    Past Surgical History   History reviewed. No pertinent surgical history.    Social History     Social History     Substance and Sexual Activity   Alcohol Use Yes    Comment: occasionally     Social History     Substance and Sexual Activity   Drug Use Never     Social History     Tobacco Use   Smoking Status Never   Smokeless Tobacco Never       Family History   History reviewed. No pertinent family history.    Medications Prior to Admission     Prior to Admission medications    Medication Sig Start Date End Date Taking? Authorizing Provider   acetaminophen (TYLENOL) 500 mg tablet Take 2 tablets (1,000 mg total) by mouth every 8 (eight) hours 2/5/25   Jay Bustamante PA-C   benzocaine (ORAJEL) 10 % mucosal gel Apply 1 Application to the mouth or throat as needed for mucositis 3/19/25   Evelyn Merida, DO   dicyclomine (BENTYL) 20 mg tablet Take 1 tablet (20 mg total) by mouth 2 (two) times a day as needed (abd pain) for up to 2 days 2/6/23 2/8/23  Dayanara Miranda,    ibuprofen (MOTRIN) 600 mg tablet Take 1 tablet (600 mg total) by mouth every  6 (six) hours as needed for mild pain 2/5/25   Jay Bustamante PA-C   ibuprofen (MOTRIN) 600 mg tablet Take 1 tablet (600 mg total) by mouth every 6 (six) hours as needed for mild pain 3/19/25   Evelyn Merida DO   metoclopramide (REGLAN) 10 mg tablet Take 1 tablet (10 mg total) by mouth every 6 (six) hours  Patient not taking: Reported on 2/6/2023 4/26/21   Abril Love PA-C   ondansetron (Zofran ODT) 4 mg disintegrating tablet Take 1 tablet (4 mg total) by mouth every 8 (eight) hours as needed for nausea or vomiting for up to 2 days 2/6/23 2/8/23  Dayanara Miranda DO   Prenatal Vit-Fe Fumarate-FA (Prenatal Complete) 14-0.4 MG TABS Take 1 tablet by mouth daily  Patient not taking: Reported on 2/6/2023 1/27/22   Felicia Tate DO       Allergies   No Known Allergies    Objective     Vitals:    04/21/25 0140 04/21/25 0143   BP: (!) 181/99    BP Location: Right arm    Pulse: 85    Resp: 16    Temp:  98.8 °F (37.1 °C)   TempSrc:  Oral   SpO2: 97%    Weight: 69.8 kg (153 lb 14.1 oz)      Body mass index is 28.15 kg/m².  No intake or output data in the 24 hours ending 04/21/25 0250  Invasive Devices       None                   Review of Systems   Constitutional:  Negative for fever.   HENT:  Positive for dental problem. Negative for sore throat, trouble swallowing and voice change.         Physical Exam  Vitals and nursing note reviewed.   Constitutional:       General: She is not in acute distress.     Appearance: Normal appearance. She is not ill-appearing or toxic-appearing.   HENT:      Head: Normocephalic and atraumatic.      Right Ear: External ear normal.      Left Ear: External ear normal.      Nose: Nose normal.      Mouth/Throat:      Comments: There is a dental carry and slight deterioration/cracking of approximately tooth #32.  No trismus on exam.  No surrounding areas of abscess or fluctuance.  No brawny edema under the tongue.  No facial swelling.  Eyes:      General: No scleral  icterus.        Right eye: No discharge.         Left eye: No discharge.      Extraocular Movements: Extraocular movements intact.      Conjunctiva/sclera: Conjunctivae normal.   Cardiovascular:      Rate and Rhythm: Normal rate.      Heart sounds: Normal heart sounds. No murmur heard.     No friction rub. No gallop.   Pulmonary:      Effort: Pulmonary effort is normal. No respiratory distress.      Breath sounds: Normal breath sounds.   Abdominal:      General: Abdomen is flat. There is no distension.      Palpations: Abdomen is soft. There is no mass.      Tenderness: There is no abdominal tenderness.   Genitourinary:     Comments: Deferred  Skin:     General: Skin is warm and dry.   Neurological:      General: No focal deficit present.      Mental Status: She is alert.          Medications   ketorolac (TORADOL) injection 15 mg (15 mg Intramuscular Given 4/21/25 0201)        Results Reviewed       None             No orders to display        ED Course         Procedures   Procedures

## 2025-04-21 NOTE — DISCHARGE INSTRUCTIONS
Please use the following pain medications:  - Tylenol 650mg every 6 hours  - Motrin 400mg every 6 hours or the prescribed Naproxen every 12 hours  - Orajel OTC  They work in different ways so can be used together at the same time.    If despite the above you have worsening symptoms please return to the emergency room for re-evaluation.     If you HAVE A DENTIST, please call to make an appointment with them for follow-up as soon as possible.  If you DO NOT have a dentist, please call [phone]. It is a service that will help you find a dentist in your area and based on your insurance (if you have it or not) after you answer some questions    You may also follow-up with the [facility] if you don't have a density.   Dental Clinic - [facility]  [address] [place] [address]  [phone]  They will see you with or without insurance and have walk in hours in the morning without appointments.     Other local dental clinics include:   DENTAL CLINICS    [facility]  [address] [address]  [phone]  Walk in house M-F 8a-noon  Emergency Dental Care    [facility]  [address], [address]nd [place] [address]  [phone]    Dr. Uvaldo Pineda  [address] [place] [address]  Takes adults & children on a waiting list    [facility]  [address] [place] [address]  [phone]  Walk in scheduled only M-F 8a-noon & 1p-4p  Uninsured received 40% discount & payments  Payment must be made upfront before the service  Emergency Dental Care    [facility]  [address] [place] [address]  [place]  [address] [place] [address]  [phone]    [facility]  Offices in [facility]  [phone]

## 2025-04-21 NOTE — ED PROVIDER NOTES
"Time reflects when diagnosis was documented in both MDM as applicable and the Disposition within this note       Time User Action Codes Description Comment    4/21/2025  1:52 AM PowellKaylie agustin Add [K08.89] Dentalgia           ED Disposition       ED Disposition   Discharge    Condition   Stable    Date/Time   Mon Apr 21, 2025  1:52 AM    Comment   Derian Polanco discharge to home/self care.                   Assessment & Plan       Medical Decision Making  Risk  Prescription drug management.      Patient is a 25 y.o. female who presents to the ED with right lower molar pain tonight.    Vital signs stable, afebrile. On exam tenderness to tooth #32. No trismus. No evidence of infection/abscess.     History and physical exam most consistent with dentalgia.     Plan: offered dental block and patient declined, opting for toradol. Patient already took 1500mg Tylenol an hour PTA.     View ED course for further discussion on patient workup.     On review of previous records patient was evaluated in this ED for same on 2/5/25 and 3/19/25.    Disposition: Stable for discharge. Strict return to ED precautions provided. Rx for Naproxen sent to pharmacy. Advised to follow up with dentist as scheduled at 10am 4/21/25 for re-evaluation and further management. Patient verbalized understanding and agrees with the plan of care.       Portions of the record may have been created with voice recognition software. Occasional wrong word or \"sound a like\" substitutions may have occurred due to the inherent limitations of voice recognition software. Read the chart carefully and recognize, using context, where substitutions have occurred.         Medications   ketorolac (TORADOL) injection 15 mg (15 mg Intramuscular Given 4/21/25 0201)       ED Risk Strat Scores                    No data recorded        SBIRT 22yo+      Flowsheet Row Most Recent Value   Initial Alcohol Screen: US AUDIT-C     1. How often do you have a drink containing " alcohol? 0 Filed at: 04/21/2025 0203   2. How many drinks containing alcohol do you have on a typical day you are drinking?  0 Filed at: 04/21/2025 0203   3a. Male UNDER 65: How often do you have five or more drinks on one occasion? 0 Filed at: 04/21/2025 0203   3b. FEMALE Any Age, or MALE 65+: How often do you have 4 or more drinks on one occassion? 0 Filed at: 04/21/2025 0203   Audit-C Score 0 Filed at: 04/21/2025 0203   GUS: How many times in the past year have you...    Used an illegal drug or used a prescription medication for non-medical reasons? Never Filed at: 04/21/2025 0203                            History of Present Illness       Chief Complaint   Patient presents with    Dental Pain     Right sided upper and lower dental pain since last night. Pt state facial swelling and numbness. Tylenol taken about 1hr pta with no relief.       History reviewed. No pertinent past medical history.   History reviewed. No pertinent surgical history.   History reviewed. No pertinent family history.   Social History     Tobacco Use    Smoking status: Never    Smokeless tobacco: Never   Vaping Use    Vaping status: Never Used   Substance Use Topics    Alcohol use: Yes     Comment: occasionally    Drug use: Never      E-Cigarette/Vaping    E-Cigarette Use Never User       E-Cigarette/Vaping Substances    Nicotine Yes       I have reviewed and agree with the history as documented.     HPI  Patient is a 25 y.o. female with no significant PMH who presents to the ED for evaluation of right lower molar pain tonight. Patient reports having same pain intermittently for the past couple of months, has been pain-free for approx 2 weeks prior to tonight. Took 1500mg Tylenol an hour PTA with minimal improvement. Denies fever, chills, difficulty swallowing, voice changes. Has an appointment with dentist in 8 hours (4/21/2025 at 10am). Denies history of any dental surgery.     Review of Systems   HENT:  Positive for dental problem.             Objective       ED Triage Vitals   Temperature Pulse Blood Pressure Respirations SpO2 Patient Position - Orthostatic VS   04/21/25 0143 04/21/25 0140 04/21/25 0140 04/21/25 0140 04/21/25 0140 04/21/25 0140   98.8 °F (37.1 °C) 85 (!) 181/99 16 97 % Sitting      Temp Source Heart Rate Source BP Location FiO2 (%) Pain Score    04/21/25 0143 04/21/25 0140 04/21/25 0140 -- 04/21/25 0201    Oral Monitor Right arm  10 - Worst Possible Pain      Vitals      Date and Time Temp Pulse SpO2 Resp BP Pain Score FACES Pain Rating User   04/21/25 0201 -- -- -- -- -- 10 - Worst Possible Pain -- [facility]   04/21/25 0143 98.8 °F (37.1 °C) -- -- -- -- -- -- JR   04/21/25 0140 -- 85 97 % 16 181/99 -- --             Physical Exam  Vitals and nursing note reviewed.   Constitutional:       General: She is not in acute distress.     Appearance: Normal appearance. She is well-developed. She is not ill-appearing, toxic-appearing or diaphoretic.   HENT:      Head: Normocephalic and atraumatic.      Nose: Nose normal.      Mouth/Throat:      Mouth: Mucous membranes are moist.      Dentition: Does not have dentures. Dental tenderness present. No gingival swelling or dental abscesses.      Pharynx: Oropharynx is clear.        Comments: No trismus   No tongue elevation  No facial swelling   Eyes:      General:         Right eye: No discharge.         Left eye: No discharge.      Conjunctiva/sclera: Conjunctivae normal.   Cardiovascular:      Rate and Rhythm: Normal rate and regular rhythm.      Heart sounds: No murmur heard.  Pulmonary:      Effort: Pulmonary effort is normal. No respiratory distress.      Breath sounds: Normal breath sounds. No stridor. No wheezing, rhonchi or rales.   Musculoskeletal:         General: No swelling.      Cervical back: Neck supple.   Skin:     General: Skin is warm and dry.      Capillary Refill: Capillary refill takes less than 2 seconds.   Neurological:      General: No focal deficit present.      Mental  Status: She is alert and oriented to person, place, and time.   Psychiatric:         Mood and Affect: Mood normal.         Results Reviewed       None            No orders to display       Procedures    ED Medication and Procedure Management   Prior to Admission Medications   Prescriptions Last Dose Informant Patient Reported? Taking?   Prenatal Vit-Fe Fumarate-FA (Prenatal Complete) 14-0.4 MG TABS   No No   Sig: Take 1 tablet by mouth daily   Patient not taking: Reported on 2/6/2023   acetaminophen (TYLENOL) 500 mg tablet   No No   Sig: Take 2 tablets (1,000 mg total) by mouth every 8 (eight) hours   benzocaine (ORAJEL) 10 % mucosal gel   No No   Sig: Apply 1 Application to the mouth or throat as needed for mucositis   dicyclomine (BENTYL) 20 mg tablet   No No   Sig: Take 1 tablet (20 mg total) by mouth 2 (two) times a day as needed (abd pain) for up to 2 days   ibuprofen (MOTRIN) 600 mg tablet   No No   Sig: Take 1 tablet (600 mg total) by mouth every 6 (six) hours as needed for mild pain   ibuprofen (MOTRIN) 600 mg tablet   No No   Sig: Take 1 tablet (600 mg total) by mouth every 6 (six) hours as needed for mild pain   metoclopramide (REGLAN) 10 mg tablet   No No   Sig: Take 1 tablet (10 mg total) by mouth every 6 (six) hours   Patient not taking: Reported on 2/6/2023   ondansetron (Zofran ODT) 4 mg disintegrating tablet   No No   Sig: Take 1 tablet (4 mg total) by mouth every 8 (eight) hours as needed for nausea or vomiting for up to 2 days      Facility-Administered Medications: None     Discharge Medication List as of 4/21/2025  1:58 AM        START taking these medications    Details   naproxen (Naprosyn) 500 mg tablet Take 1 tablet (500 mg total) by mouth 2 (two) times a day with meals for 5 days, Starting Mon 4/21/2025, Until Sat 4/26/2025, Normal           CONTINUE these medications which have NOT CHANGED    Details   acetaminophen (TYLENOL) 500 mg tablet Take 2 tablets (1,000 mg total) by mouth every 8  (eight) hours, Starting Wed 2/5/2025, Normal      benzocaine (ORAJEL) 10 % mucosal gel Apply 1 Application to the mouth or throat as needed for mucositis, Starting Wed 3/19/2025, Normal      dicyclomine (BENTYL) 20 mg tablet Take 1 tablet (20 mg total) by mouth 2 (two) times a day as needed (abd pain) for up to 2 days, Starting Mon 2/6/2023, Until Wed 2/8/2023 at 2359, Normal      !! ibuprofen (MOTRIN) 600 mg tablet Take 1 tablet (600 mg total) by mouth every 6 (six) hours as needed for mild pain, Starting Wed 2/5/2025, Normal      !! ibuprofen (MOTRIN) 600 mg tablet Take 1 tablet (600 mg total) by mouth every 6 (six) hours as needed for mild pain, Starting Wed 3/19/2025, Normal      metoclopramide (REGLAN) 10 mg tablet Take 1 tablet (10 mg total) by mouth every 6 (six) hours, Starting Mon 4/26/2021, Normal      ondansetron (Zofran ODT) 4 mg disintegrating tablet Take 1 tablet (4 mg total) by mouth every 8 (eight) hours as needed for nausea or vomiting for up to 2 days, Starting Mon 2/6/2023, Until Wed 2/8/2023 at 2359, Normal      Prenatal Vit-Fe Fumarate-FA (Prenatal Complete) 14-0.4 MG TABS Take 1 tablet by mouth daily, Starting Thu 1/27/2022, Print       !! - Potential duplicate medications found. Please discuss with provider.        No discharge procedures on file.  ED SEPSIS DOCUMENTATION   Time reflects when diagnosis was documented in both MDM as applicable and the Disposition within this note       Time User Action Codes Description Comment    4/21/2025  1:52 AM Kaylie Powell Add [K08.89] Dentalgia                  Kaylie Powell MD  04/21/25 020